# Patient Record
Sex: MALE | Race: WHITE | NOT HISPANIC OR LATINO | Employment: OTHER | URBAN - NONMETROPOLITAN AREA
[De-identification: names, ages, dates, MRNs, and addresses within clinical notes are randomized per-mention and may not be internally consistent; named-entity substitution may affect disease eponyms.]

---

## 2017-07-10 ENCOUNTER — HOSPITAL ENCOUNTER (EMERGENCY)
Facility: HOSPITAL | Age: 37
Discharge: HOME | End: 2017-07-10
Attending: STUDENT IN AN ORGANIZED HEALTH CARE EDUCATION/TRAINING PROGRAM | Admitting: FAMILY MEDICINE

## 2017-07-10 ENCOUNTER — APPOINTMENT (OUTPATIENT)
Dept: ULTRASOUND IMAGING | Facility: HOSPITAL | Age: 37
End: 2017-07-10

## 2017-07-10 VITALS
HEART RATE: 65 BPM | SYSTOLIC BLOOD PRESSURE: 128 MMHG | HEIGHT: 72 IN | DIASTOLIC BLOOD PRESSURE: 84 MMHG | OXYGEN SATURATION: 98 % | BODY MASS INDEX: 29.8 KG/M2 | TEMPERATURE: 98.4 F | WEIGHT: 220 LBS | RESPIRATION RATE: 17 BRPM

## 2017-07-10 DIAGNOSIS — Z86.39 HX OF DIABETES MELLITUS: ICD-10-CM

## 2017-07-10 DIAGNOSIS — R10.11 RIGHT UPPER QUADRANT ABDOMINAL PAIN: Primary | ICD-10-CM

## 2017-07-10 LAB
ALBUMIN SERPL-MCNC: 4.2 G/DL (ref 3.5–5)
ALBUMIN/GLOB SERPL: 1.5 G/DL (ref 1–2)
ALP SERPL-CCNC: 63 U/L (ref 38–126)
ALT SERPL W P-5'-P-CCNC: 61 U/L (ref 13–69)
AMYLASE SERPL-CCNC: 61 U/L (ref 30–110)
ANION GAP SERPL CALCULATED.3IONS-SCNC: 14.1 MMOL/L
AST SERPL-CCNC: 20 U/L (ref 15–46)
BASOPHILS # BLD AUTO: 0.02 10*3/MM3 (ref 0–0.2)
BASOPHILS NFR BLD AUTO: 0.3 % (ref 0–2.5)
BILIRUB SERPL-MCNC: 0.4 MG/DL (ref 0.2–1.3)
BILIRUB UR QL STRIP: NEGATIVE
BUN BLD-MCNC: 15 MG/DL (ref 7–20)
BUN/CREAT SERPL: 18.8 (ref 6.3–21.9)
CALCIUM SPEC-SCNC: 9.6 MG/DL (ref 8.4–10.2)
CHLORIDE SERPL-SCNC: 104 MMOL/L (ref 98–107)
CLARITY UR: CLEAR
CO2 SERPL-SCNC: 25 MMOL/L (ref 26–30)
COLOR UR: YELLOW
CREAT BLD-MCNC: 0.8 MG/DL (ref 0.6–1.3)
DEPRECATED RDW RBC AUTO: 38.9 FL (ref 37–54)
EOSINOPHIL # BLD AUTO: 0.16 10*3/MM3 (ref 0–0.7)
EOSINOPHIL NFR BLD AUTO: 2.7 % (ref 0–7)
ERYTHROCYTE [DISTWIDTH] IN BLOOD BY AUTOMATED COUNT: 12.9 % (ref 11.5–14.5)
GFR SERPL CREATININE-BSD FRML MDRD: 109 ML/MIN/1.73
GLOBULIN UR ELPH-MCNC: 2.8 GM/DL
GLUCOSE BLD-MCNC: 352 MG/DL (ref 74–98)
GLUCOSE UR STRIP-MCNC: ABNORMAL MG/DL
HCT VFR BLD AUTO: 42.2 % (ref 42–52)
HGB BLD-MCNC: 14.7 G/DL (ref 14–18)
HGB UR QL STRIP.AUTO: NEGATIVE
HOLD SPECIMEN: NORMAL
IMM GRANULOCYTES # BLD: 0.02 10*3/MM3 (ref 0–0.06)
IMM GRANULOCYTES NFR BLD: 0.3 % (ref 0–0.6)
KETONES UR QL STRIP: NEGATIVE
LEUKOCYTE ESTERASE UR QL STRIP.AUTO: NEGATIVE
LIPASE SERPL-CCNC: 80 U/L (ref 23–300)
LYMPHOCYTES # BLD AUTO: 1.59 10*3/MM3 (ref 0.6–3.4)
LYMPHOCYTES NFR BLD AUTO: 27.3 % (ref 10–50)
MCH RBC QN AUTO: 29.2 PG (ref 27–31)
MCHC RBC AUTO-ENTMCNC: 34.8 G/DL (ref 30–37)
MCV RBC AUTO: 83.9 FL (ref 80–94)
MONOCYTES # BLD AUTO: 0.37 10*3/MM3 (ref 0–0.9)
MONOCYTES NFR BLD AUTO: 6.4 % (ref 0–12)
NEUTROPHILS # BLD AUTO: 3.66 10*3/MM3 (ref 2–6.9)
NEUTROPHILS NFR BLD AUTO: 63 % (ref 37–80)
NITRITE UR QL STRIP: NEGATIVE
NRBC BLD MANUAL-RTO: 0 /100 WBC (ref 0–0)
PH UR STRIP.AUTO: 6 [PH] (ref 5–8)
PLATELET # BLD AUTO: 230 10*3/MM3 (ref 130–400)
PMV BLD AUTO: 9.5 FL (ref 6–12)
POTASSIUM BLD-SCNC: 4.1 MMOL/L (ref 3.5–5.1)
PROT SERPL-MCNC: 7 G/DL (ref 6.3–8.2)
PROT UR QL STRIP: NEGATIVE
RBC # BLD AUTO: 5.03 10*6/MM3 (ref 4.7–6.1)
SODIUM BLD-SCNC: 139 MMOL/L (ref 137–145)
SP GR UR STRIP: 1.01 (ref 1–1.03)
UROBILINOGEN UR QL STRIP: ABNORMAL
WBC NRBC COR # BLD: 5.82 10*3/MM3 (ref 4.8–10.8)
WHOLE BLOOD HOLD SPECIMEN: NORMAL
WHOLE BLOOD HOLD SPECIMEN: NORMAL

## 2017-07-10 PROCEDURE — 76705 ECHO EXAM OF ABDOMEN: CPT

## 2017-07-10 PROCEDURE — 82150 ASSAY OF AMYLASE: CPT | Performed by: NURSE PRACTITIONER

## 2017-07-10 PROCEDURE — 80053 COMPREHEN METABOLIC PANEL: CPT

## 2017-07-10 PROCEDURE — 99283 EMERGENCY DEPT VISIT LOW MDM: CPT

## 2017-07-10 PROCEDURE — 83690 ASSAY OF LIPASE: CPT

## 2017-07-10 PROCEDURE — 81003 URINALYSIS AUTO W/O SCOPE: CPT

## 2017-07-10 PROCEDURE — 85025 COMPLETE CBC W/AUTO DIFF WBC: CPT

## 2017-07-10 RX ORDER — GLIPIZIDE 5 MG/1
5 TABLET ORAL
COMMUNITY

## 2017-07-10 RX ORDER — SODIUM CHLORIDE 0.9 % (FLUSH) 0.9 %
10 SYRINGE (ML) INJECTION AS NEEDED
Status: DISCONTINUED | OUTPATIENT
Start: 2017-07-10 | End: 2017-07-10 | Stop reason: HOSPADM

## 2017-07-10 RX ORDER — ALUMINA, MAGNESIA, AND SIMETHICONE 2400; 2400; 240 MG/30ML; MG/30ML; MG/30ML
15 SUSPENSION ORAL ONCE
Status: COMPLETED | OUTPATIENT
Start: 2017-07-10 | End: 2017-07-10

## 2017-07-10 RX ADMIN — ALUMINUM HYDROXIDE, MAGNESIUM HYDROXIDE, AND DIMETHICONE 15 ML: 400; 400; 40 SUSPENSION ORAL at 14:59

## 2017-07-10 RX ADMIN — LIDOCAINE HYDROCHLORIDE 15 ML: 20 SOLUTION ORAL; TOPICAL at 14:59

## 2017-07-10 NOTE — ED PROVIDER NOTES
Subjective   HPI Comments: Patient presents to the emergency department with complaint of right upper quadrant abdominal pain that began suddenly about one hour ago while eating a meal.  Patient has no prior history of abdominal disorder with exception of a diagnosis of H. pylori for which he took the standard medications and completed them.  Patient had nausea without vomiting.  No diarrhea.  No history constipation.  Patient's pain has relieved somewhat    Patient is a 36 y.o. male presenting with abdominal pain.   History provided by:  Patient   used: No    Abdominal Pain   Pain location:  RUQ  Pain quality: aching    Pain radiates to:  Does not radiate  Pain severity:  Mild  Onset quality:  Sudden  Duration:  2 hours  Timing:  Constant  Progression:  Improving  Chronicity:  New  Context: eating    Context: not alcohol use, not awakening from sleep, not diet changes, not laxative use, not medication withdrawal, not previous surgeries, not recent travel, not retching, not sick contacts, not suspicious food intake and not trauma  Recent illness: Helicobacter pylori.    Relieved by:  Nothing  Worsened by:  Nothing  Ineffective treatments:  None tried  Associated symptoms: no anorexia, no belching, no chest pain, no chills, no constipation, no cough, no diarrhea, no dysuria, no fatigue, no fever, no flatus, no hematemesis, no hematochezia, no hematuria, no melena, no nausea, no shortness of breath, no sore throat and no vomiting        Review of Systems   Constitutional: Negative.  Negative for chills, diaphoresis, fatigue and fever.   HENT: Negative for sore throat.    Eyes: Negative.  Negative for pain and visual disturbance.   Respiratory: Negative for cough, shortness of breath, wheezing and stridor.    Cardiovascular: Negative.  Negative for chest pain.   Gastrointestinal: Positive for abdominal pain. Negative for anorexia, constipation, diarrhea, flatus, hematemesis, hematochezia, melena,  nausea and vomiting.   Endocrine: Negative.    Genitourinary: Negative.  Negative for dysuria and hematuria.   Musculoskeletal: Negative.  Negative for back pain and neck pain.   Skin: Negative.  Negative for pallor and rash.   Allergic/Immunologic: Negative.    Neurological: Negative.  Negative for syncope and headaches.   Hematological: Negative.    Psychiatric/Behavioral: Negative.  Negative for agitation.   All other systems reviewed and are negative.      Past Medical History:   Diagnosis Date   • Diabetes mellitus        Allergies   Allergen Reactions   • Codeine      Parent allergy   • Naproxen Swelling       History reviewed. No pertinent surgical history.    History reviewed. No pertinent family history.    Social History     Social History   • Marital status:      Spouse name: N/A   • Number of children: N/A   • Years of education: N/A     Social History Main Topics   • Smoking status: Never Smoker   • Smokeless tobacco: None   • Alcohol use Yes      Comment: occassional   • Drug use: No   • Sexual activity: Not Asked     Other Topics Concern   • None     Social History Narrative   • None           Objective   Physical Exam   Constitutional: He is oriented to person, place, and time. He appears well-developed and well-nourished. No distress.   HENT:   Head: Normocephalic and atraumatic.   Right Ear: External ear normal.   Left Ear: External ear normal.   Eyes: EOM are normal. Pupils are equal, round, and reactive to light.   Neck: Normal range of motion. Neck supple. No tracheal deviation present.   Cardiovascular: Normal rate and regular rhythm.    Pulmonary/Chest: Effort normal. No respiratory distress. He has no wheezes. He has no rales. He exhibits no tenderness.   Abdominal: Soft. Bowel sounds are normal. He exhibits no distension. Tenderness: Right upper quadrant, mild.  No guarding. There is no rebound and no guarding.   Musculoskeletal: Normal range of motion. He exhibits no edema,  tenderness or deformity.   Lymphadenopathy:     He has no cervical adenopathy.   Neurological: He is alert and oriented to person, place, and time.   Skin: Skin is warm and dry. No rash noted. He is not diaphoretic. No erythema. No pallor.   Psychiatric: He has a normal mood and affect. His behavior is normal.   Nursing note and vitals reviewed.      Procedures         ED Course  ED Course      Recent Results (from the past 24 hour(s))   Comprehensive Metabolic Panel    Collection Time: 07/10/17  1:54 PM   Result Value Ref Range    Glucose 352 (C) 74 - 98 mg/dL    BUN 15 7 - 20 mg/dL    Creatinine 0.80 0.60 - 1.30 mg/dL    Sodium 139 137 - 145 mmol/L    Potassium 4.1 3.5 - 5.1 mmol/L    Chloride 104 98 - 107 mmol/L    CO2 25.0 (L) 26.0 - 30.0 mmol/L    Calcium 9.6 8.4 - 10.2 mg/dL    Total Protein 7.0 6.3 - 8.2 g/dL    Albumin 4.20 3.50 - 5.00 g/dL    ALT (SGPT) 61 13 - 69 U/L    AST (SGOT) 20 15 - 46 U/L    Alkaline Phosphatase 63 38 - 126 U/L    Total Bilirubin 0.4 0.2 - 1.3 mg/dL    eGFR Non African Amer 109 >60 mL/min/1.73    Globulin 2.8 gm/dL    A/G Ratio 1.5 1.0 - 2.0 g/dL    BUN/Creatinine Ratio 18.8 6.3 - 21.9    Anion Gap 14.1 mmol/L   Lipase    Collection Time: 07/10/17  1:54 PM   Result Value Ref Range    Lipase 80 23 - 300 U/L   Light Blue Top    Collection Time: 07/10/17  1:54 PM   Result Value Ref Range    Extra Tube hold for add-on    Lavender Top    Collection Time: 07/10/17  1:54 PM   Result Value Ref Range    Extra Tube hold for add-on    Gold Top - SST    Collection Time: 07/10/17  1:54 PM   Result Value Ref Range    Extra Tube Hold for add-ons.    CBC Auto Differential    Collection Time: 07/10/17  1:54 PM   Result Value Ref Range    WBC 5.82 4.80 - 10.80 10*3/mm3    RBC 5.03 4.70 - 6.10 10*6/mm3    Hemoglobin 14.7 14.0 - 18.0 g/dL    Hematocrit 42.2 42.0 - 52.0 %    MCV 83.9 80.0 - 94.0 fL    MCH 29.2 27.0 - 31.0 pg    MCHC 34.8 30.0 - 37.0 g/dL    RDW 12.9 11.5 - 14.5 %    RDW-SD 38.9 37.0 -  54.0 fl    MPV 9.5 6.0 - 12.0 fL    Platelets 230 130 - 400 10*3/mm3    Neutrophil % 63.0 37.0 - 80.0 %    Lymphocyte % 27.3 10.0 - 50.0 %    Monocyte % 6.4 0.0 - 12.0 %    Eosinophil % 2.7 0.0 - 7.0 %    Basophil % 0.3 0.0 - 2.5 %    Immature Grans % 0.3 0.0 - 0.6 %    Neutrophils, Absolute 3.66 2.00 - 6.90 10*3/mm3    Lymphocytes, Absolute 1.59 0.60 - 3.40 10*3/mm3    Monocytes, Absolute 0.37 0.00 - 0.90 10*3/mm3    Eosinophils, Absolute 0.16 0.00 - 0.70 10*3/mm3    Basophils, Absolute 0.02 0.00 - 0.20 10*3/mm3    Immature Grans, Absolute 0.02 0.00 - 0.06 10*3/mm3    nRBC 0.0 0.0 - 0.0 /100 WBC   Amylase    Collection Time: 07/10/17  1:54 PM   Result Value Ref Range    Amylase 61 30 - 110 U/L   Urinalysis With / Culture If Indicated    Collection Time: 07/10/17  1:55 PM   Result Value Ref Range    Color, UA Yellow Yellow, Straw    Appearance, UA Clear Clear    pH, UA 6.0 5.0 - 8.0    Specific Gravity, UA 1.015 1.005 - 1.030    Glucose, UA >=1000 mg/dL (3+) (A) Negative    Ketones, UA Negative Negative    Bilirubin, UA Negative Negative    Blood, UA Negative Negative    Protein, UA Negative Negative    Leuk Esterase, UA Negative Negative    Nitrite, UA Negative Negative    Urobilinogen, UA 0.2 E.U./dL 0.2 - 1.0 E.U./dL     Note: In addition to lab results from this visit, the labs listed above may include labs taken at another facility or during a different encounter within the last 24 hours. Please correlate lab times with ED admission and discharge times for further clarification of the services performed during this visit.    US Gallbladder   Final Result   No obvious gallstones although assessment is limited due to   gallbladder contraction           This report was finalized on 7/10/2017 3:18 PM by Guru Alexander MD.        Vitals:    07/10/17 1203   BP: 152/83   BP Location: Left arm   Patient Position: Sitting   Pulse: 85   Resp: 17   Temp: 98.1 °F (36.7 °C)   TempSrc: Oral   SpO2: 95%   Weight: 220 lb (99.8  "kg)   Height: 72\" (182.9 cm)     Medications   sodium chloride 0.9 % flush 10 mL (not administered)   aluminum-magnesium hydroxide-simethicone (MAALOX MAX) 400-400-40 MG/5ML suspension 15 mL (15 mL Oral Given 7/10/17 0756)   lidocaine viscous (XYLOCAINE) 2 % mouth solution 15 mL (15 mL Mouth/Throat Given 7/10/17 1459)     ECG/EMG Results (last 24 hours)     ** No results found for the last 24 hours. **                      [facility]    Final diagnoses:   Right upper quadrant abdominal pain   Hx of diabetes mellitus            Juli Ortiz, APRN  07/10/17 1612    "

## 2017-07-28 ENCOUNTER — HOSPITAL ENCOUNTER (EMERGENCY)
Facility: HOSPITAL | Age: 37
Discharge: HOME | End: 2017-07-28
Attending: EMERGENCY MEDICINE | Admitting: EMERGENCY MEDICINE

## 2017-07-28 VITALS
HEIGHT: 72 IN | TEMPERATURE: 98.3 F | SYSTOLIC BLOOD PRESSURE: 131 MMHG | OXYGEN SATURATION: 96 % | RESPIRATION RATE: 19 BRPM | HEART RATE: 97 BPM | WEIGHT: 220 LBS | BODY MASS INDEX: 29.8 KG/M2 | DIASTOLIC BLOOD PRESSURE: 74 MMHG

## 2017-07-28 DIAGNOSIS — L02.415 ABSCESS OF HIP, RIGHT: ICD-10-CM

## 2017-07-28 DIAGNOSIS — L03.115 CELLULITIS OF HIP, RIGHT: ICD-10-CM

## 2017-07-28 DIAGNOSIS — L02.416 ABSCESS OF LEFT KNEE: Primary | ICD-10-CM

## 2017-07-28 LAB
ALBUMIN SERPL-MCNC: 4.5 G/DL (ref 3.5–5)
ALBUMIN/GLOB SERPL: 1.5 G/DL (ref 1–2)
ALP SERPL-CCNC: 78 U/L (ref 38–126)
ALT SERPL W P-5'-P-CCNC: 55 U/L (ref 13–69)
AMPHET+METHAMPHET UR QL: NEGATIVE
AMPHETAMINES UR QL: NEGATIVE
ANION GAP SERPL CALCULATED.3IONS-SCNC: 14.8 MMOL/L
AST SERPL-CCNC: 21 U/L (ref 15–46)
BARBITURATES UR QL SCN: NEGATIVE
BASOPHILS # BLD AUTO: 0.03 10*3/MM3 (ref 0–0.2)
BASOPHILS NFR BLD AUTO: 0.3 % (ref 0–2.5)
BENZODIAZ UR QL SCN: NEGATIVE
BILIRUB SERPL-MCNC: 0.5 MG/DL (ref 0.2–1.3)
BILIRUB UR QL STRIP: NEGATIVE
BUN BLD-MCNC: 17 MG/DL (ref 7–20)
BUN/CREAT SERPL: 21.3 (ref 6.3–21.9)
BUPRENORPHINE SERPL-MCNC: NEGATIVE NG/ML
CALCIUM SPEC-SCNC: 9.5 MG/DL (ref 8.4–10.2)
CANNABINOIDS SERPL QL: NEGATIVE
CHLORIDE SERPL-SCNC: 103 MMOL/L (ref 98–107)
CLARITY UR: CLEAR
CO2 SERPL-SCNC: 24 MMOL/L (ref 26–30)
COCAINE UR QL: NEGATIVE
COLOR UR: YELLOW
CREAT BLD-MCNC: 0.8 MG/DL (ref 0.6–1.3)
CRP SERPL-MCNC: 1.7 MG/DL (ref 0–1)
DEPRECATED RDW RBC AUTO: 40.1 FL (ref 37–54)
EOSINOPHIL # BLD AUTO: 0.14 10*3/MM3 (ref 0–0.7)
EOSINOPHIL NFR BLD AUTO: 1.3 % (ref 0–7)
ERYTHROCYTE [DISTWIDTH] IN BLOOD BY AUTOMATED COUNT: 13 % (ref 11.5–14.5)
ERYTHROCYTE [SEDIMENTATION RATE] IN BLOOD: 10 MM/HR (ref 0–15)
GFR SERPL CREATININE-BSD FRML MDRD: 109 ML/MIN/1.73
GLOBULIN UR ELPH-MCNC: 3 GM/DL
GLUCOSE BLD-MCNC: 179 MG/DL (ref 74–98)
GLUCOSE UR STRIP-MCNC: ABNORMAL MG/DL
HCT VFR BLD AUTO: 43.3 % (ref 42–52)
HGB BLD-MCNC: 14.9 G/DL (ref 14–18)
HGB UR QL STRIP.AUTO: NEGATIVE
IMM GRANULOCYTES # BLD: 0.03 10*3/MM3 (ref 0–0.06)
IMM GRANULOCYTES NFR BLD: 0.3 % (ref 0–0.6)
KETONES UR QL STRIP: ABNORMAL
LEUKOCYTE ESTERASE UR QL STRIP.AUTO: NEGATIVE
LYMPHOCYTES # BLD AUTO: 2.01 10*3/MM3 (ref 0.6–3.4)
LYMPHOCYTES NFR BLD AUTO: 18.5 % (ref 10–50)
MCH RBC QN AUTO: 28.9 PG (ref 27–31)
MCHC RBC AUTO-ENTMCNC: 34.4 G/DL (ref 30–37)
MCV RBC AUTO: 84.1 FL (ref 80–94)
METHADONE UR QL SCN: NEGATIVE
MONOCYTES # BLD AUTO: 0.68 10*3/MM3 (ref 0–0.9)
MONOCYTES NFR BLD AUTO: 6.3 % (ref 0–12)
NEUTROPHILS # BLD AUTO: 7.97 10*3/MM3 (ref 2–6.9)
NEUTROPHILS NFR BLD AUTO: 73.3 % (ref 37–80)
NITRITE UR QL STRIP: NEGATIVE
NRBC BLD MANUAL-RTO: 0 /100 WBC (ref 0–0)
OPIATES UR QL: NEGATIVE
OXYCODONE UR QL SCN: NEGATIVE
PCP UR QL SCN: NEGATIVE
PH UR STRIP.AUTO: 6 [PH] (ref 5–8)
PLATELET # BLD AUTO: 238 10*3/MM3 (ref 130–400)
PMV BLD AUTO: 9.5 FL (ref 6–12)
POTASSIUM BLD-SCNC: 3.8 MMOL/L (ref 3.5–5.1)
PROPOXYPH UR QL: NEGATIVE
PROT SERPL-MCNC: 7.5 G/DL (ref 6.3–8.2)
PROT UR QL STRIP: NEGATIVE
RBC # BLD AUTO: 5.15 10*6/MM3 (ref 4.7–6.1)
SODIUM BLD-SCNC: 138 MMOL/L (ref 137–145)
SP GR UR STRIP: >=1.03 (ref 1–1.03)
TRICYCLICS UR QL SCN: NEGATIVE
UROBILINOGEN UR QL STRIP: ABNORMAL
WBC NRBC COR # BLD: 10.86 10*3/MM3 (ref 4.8–10.8)

## 2017-07-28 PROCEDURE — 80053 COMPREHEN METABOLIC PANEL: CPT | Performed by: PHYSICIAN ASSISTANT

## 2017-07-28 PROCEDURE — 99284 EMERGENCY DEPT VISIT MOD MDM: CPT

## 2017-07-28 PROCEDURE — 85651 RBC SED RATE NONAUTOMATED: CPT | Performed by: PHYSICIAN ASSISTANT

## 2017-07-28 PROCEDURE — 85025 COMPLETE CBC W/AUTO DIFF WBC: CPT | Performed by: PHYSICIAN ASSISTANT

## 2017-07-28 PROCEDURE — 81003 URINALYSIS AUTO W/O SCOPE: CPT | Performed by: PHYSICIAN ASSISTANT

## 2017-07-28 PROCEDURE — 86140 C-REACTIVE PROTEIN: CPT | Performed by: PHYSICIAN ASSISTANT

## 2017-07-28 PROCEDURE — 80306 DRUG TEST PRSMV INSTRMNT: CPT | Performed by: PHYSICIAN ASSISTANT

## 2017-07-28 RX ORDER — SULFAMETHOXAZOLE AND TRIMETHOPRIM 800; 160 MG/1; MG/1
1 TABLET ORAL ONCE
Status: COMPLETED | OUTPATIENT
Start: 2017-07-28 | End: 2017-07-28

## 2017-07-28 RX ORDER — SULFAMETHOXAZOLE AND TRIMETHOPRIM 800; 160 MG/1; MG/1
1 TABLET ORAL 2 TIMES DAILY
Qty: 20 TABLET | Refills: 0 | Status: SHIPPED | OUTPATIENT
Start: 2017-07-28 | End: 2017-08-04

## 2017-07-28 RX ORDER — HYDROCODONE BITARTRATE AND ACETAMINOPHEN 5; 325 MG/1; MG/1
1 TABLET ORAL EVERY 6 HOURS PRN
Qty: 12 TABLET | Refills: 0 | Status: SHIPPED | OUTPATIENT
Start: 2017-07-28

## 2017-07-28 RX ORDER — HYDROCODONE BITARTRATE AND ACETAMINOPHEN 5; 325 MG/1; MG/1
1 TABLET ORAL ONCE
Status: COMPLETED | OUTPATIENT
Start: 2017-07-28 | End: 2017-07-28

## 2017-07-28 RX ORDER — LIDOCAINE HYDROCHLORIDE 10 MG/ML
10 INJECTION, SOLUTION INFILTRATION; PERINEURAL ONCE
Status: COMPLETED | OUTPATIENT
Start: 2017-07-28 | End: 2017-07-28

## 2017-07-28 RX ADMIN — SULFAMETHOXAZOLE AND TRIMETHOPRIM 160 MG: 800; 160 TABLET ORAL at 22:05

## 2017-07-28 RX ADMIN — LIDOCAINE HYDROCHLORIDE 10 ML: 10 INJECTION, SOLUTION INFILTRATION; PERINEURAL at 22:00

## 2017-07-28 RX ADMIN — HYDROCODONE BITARTRATE AND ACETAMINOPHEN 1 TABLET: 5; 325 TABLET ORAL at 22:05

## 2018-10-02 ENCOUNTER — HOSPITAL ENCOUNTER (OUTPATIENT)
Dept: GENERAL RADIOLOGY | Facility: HOSPITAL | Age: 38
Discharge: HOME | End: 2018-10-02
Admitting: FAMILY MEDICINE

## 2018-10-02 ENCOUNTER — TRANSCRIBE ORDERS (OUTPATIENT)
Dept: ADMINISTRATIVE | Facility: HOSPITAL | Age: 38
End: 2018-10-02

## 2018-10-02 DIAGNOSIS — M54.50 BILATERAL LOW BACK PAIN WITHOUT SCIATICA, UNSPECIFIED CHRONICITY: Primary | ICD-10-CM

## 2018-10-02 PROCEDURE — 72100 X-RAY EXAM L-S SPINE 2/3 VWS: CPT

## 2018-12-18 ENCOUNTER — HOSPITAL ENCOUNTER (EMERGENCY)
Facility: HOSPITAL | Age: 38
Discharge: HOME | End: 2018-12-18
Attending: EMERGENCY MEDICINE | Admitting: STUDENT IN AN ORGANIZED HEALTH CARE EDUCATION/TRAINING PROGRAM

## 2018-12-18 VITALS
HEART RATE: 99 BPM | OXYGEN SATURATION: 97 % | SYSTOLIC BLOOD PRESSURE: 126 MMHG | TEMPERATURE: 98.6 F | HEIGHT: 72 IN | WEIGHT: 224 LBS | RESPIRATION RATE: 16 BRPM | DIASTOLIC BLOOD PRESSURE: 76 MMHG | BODY MASS INDEX: 30.34 KG/M2

## 2018-12-18 DIAGNOSIS — J02.0 STREP THROAT: Primary | ICD-10-CM

## 2018-12-18 DIAGNOSIS — E10.65 HYPERGLYCEMIA DUE TO TYPE 1 DIABETES MELLITUS (HCC): ICD-10-CM

## 2018-12-18 LAB
A-A DO2: ABNORMAL MMHG
ALBUMIN SERPL-MCNC: 3.9 G/DL (ref 3.5–5)
ALBUMIN/GLOB SERPL: 1.5 G/DL (ref 1–2)
ALP SERPL-CCNC: 62 U/L (ref 38–126)
ALT SERPL W P-5'-P-CCNC: 38 U/L (ref 13–69)
ANION GAP SERPL CALCULATED.3IONS-SCNC: 9.6 MMOL/L (ref 10–20)
ARTERIAL PATENCY WRIST A: POSITIVE
AST SERPL-CCNC: 15 U/L (ref 15–46)
ATMOSPHERIC PRESS: 737 MMHG
BASE EXCESS BLDA CALC-SCNC: 0 MMOL/L (ref 0–2)
BASOPHILS # BLD AUTO: 0.03 10*3/MM3 (ref 0–0.2)
BASOPHILS # BLD AUTO: 0.05 10*3/MM3 (ref 0–0.2)
BASOPHILS NFR BLD AUTO: 0.2 % (ref 0–2.5)
BASOPHILS NFR BLD AUTO: 0.3 % (ref 0–2.5)
BDY SITE: ABNORMAL
BILIRUB SERPL-MCNC: 0.4 MG/DL (ref 0.2–1.3)
BUN BLD-MCNC: 9 MG/DL (ref 7–20)
BUN/CREAT SERPL: 11.3 (ref 6.3–21.9)
CALCIUM SPEC-SCNC: 8.4 MG/DL (ref 8.4–10.2)
CHLORIDE SERPL-SCNC: 105 MMOL/L (ref 98–107)
CO2 SERPL-SCNC: 25 MMOL/L (ref 26–30)
COHGB MFR BLD: <0.6 % (ref 0–2)
CREAT BLD-MCNC: 0.8 MG/DL (ref 0.6–1.3)
DEPRECATED RDW RBC AUTO: 41.1 FL (ref 37–54)
DEPRECATED RDW RBC AUTO: 41.6 FL (ref 37–54)
EOSINOPHIL # BLD AUTO: 0 10*3/MM3 (ref 0–0.7)
EOSINOPHIL # BLD AUTO: 0 10*3/MM3 (ref 0–0.7)
EOSINOPHIL NFR BLD AUTO: 0 % (ref 0–7)
EOSINOPHIL NFR BLD AUTO: 0 % (ref 0–7)
ERYTHROCYTE [DISTWIDTH] IN BLOOD BY AUTOMATED COUNT: 12.9 % (ref 11.5–14.5)
ERYTHROCYTE [DISTWIDTH] IN BLOOD BY AUTOMATED COUNT: 13 % (ref 11.5–14.5)
FLUAV AG NPH QL: NEGATIVE
FLUBV AG NPH QL IA: NEGATIVE
GFR SERPL CREATININE-BSD FRML MDRD: 108 ML/MIN/1.73
GLOBULIN UR ELPH-MCNC: 2.6 GM/DL
GLUCOSE BLD-MCNC: 212 MG/DL (ref 74–98)
GLUCOSE BLDC GLUCOMTR-MCNC: 289 MG/DL (ref 70–130)
HCO3 BLDA-SCNC: 23.2 MMOL/L (ref 22–28)
HCT VFR BLD AUTO: 39.2 % (ref 42–52)
HCT VFR BLD AUTO: 43 % (ref 42–52)
HCT VFR BLD CALC: 43.9 %
HGB BLD-MCNC: 13.7 G/DL (ref 14–18)
HGB BLD-MCNC: 14.4 G/DL (ref 14–18)
HGB BLDA-MCNC: 14.3 G/DL (ref 12–18)
HOROWITZ INDEX BLD+IHG-RTO: 21 %
IMM GRANULOCYTES # BLD: 0.12 10*3/MM3 (ref 0–0.06)
IMM GRANULOCYTES # BLD: 0.15 10*3/MM3 (ref 0–0.06)
IMM GRANULOCYTES NFR BLD: 0.7 % (ref 0–0.6)
IMM GRANULOCYTES NFR BLD: 0.8 % (ref 0–0.6)
LYMPHOCYTES # BLD AUTO: 1.2 10*3/MM3 (ref 0.6–3.4)
LYMPHOCYTES # BLD AUTO: 1.31 10*3/MM3 (ref 0.6–3.4)
LYMPHOCYTES NFR BLD AUTO: 6.4 % (ref 10–50)
LYMPHOCYTES NFR BLD AUTO: 7.7 % (ref 10–50)
MCH RBC QN AUTO: 29.3 PG (ref 27–31)
MCH RBC QN AUTO: 30.6 PG (ref 27–31)
MCHC RBC AUTO-ENTMCNC: 33.5 G/DL (ref 30–37)
MCHC RBC AUTO-ENTMCNC: 34.9 G/DL (ref 30–37)
MCV RBC AUTO: 87.4 FL (ref 80–94)
MCV RBC AUTO: 87.5 FL (ref 80–94)
METHGB BLD QL: 0.8 % (ref 0–1.5)
MODALITY: ABNORMAL
MONOCYTES # BLD AUTO: 0.74 10*3/MM3 (ref 0–0.9)
MONOCYTES # BLD AUTO: 0.74 10*3/MM3 (ref 0–0.9)
MONOCYTES NFR BLD AUTO: 3.9 % (ref 0–12)
MONOCYTES NFR BLD AUTO: 4.3 % (ref 0–12)
NEUTROPHILS # BLD AUTO: 14.83 10*3/MM3 (ref 2–6.9)
NEUTROPHILS # BLD AUTO: 16.7 10*3/MM3 (ref 2–6.9)
NEUTROPHILS NFR BLD AUTO: 87.1 % (ref 37–80)
NEUTROPHILS NFR BLD AUTO: 88.6 % (ref 37–80)
NOTE: ABNORMAL
NRBC BLD MANUAL-RTO: 0 /100 WBC (ref 0–0)
NRBC BLD MANUAL-RTO: 0 /100 WBC (ref 0–0)
OXYHGB MFR BLDV: 95.1 % (ref 94–99)
PCO2 BLDA: 32.9 MM HG (ref 35–45)
PCO2 TEMP ADJ BLD: ABNORMAL MM HG (ref 35–48)
PH BLDA: 7.46 PH UNITS (ref 7.3–7.5)
PH, TEMP CORRECTED: ABNORMAL PH UNITS
PLATELET # BLD AUTO: 206 10*3/MM3 (ref 130–400)
PLATELET # BLD AUTO: 236 10*3/MM3 (ref 130–400)
PMV BLD AUTO: 8.9 FL (ref 6–12)
PMV BLD AUTO: 9.1 FL (ref 6–12)
PO2 BLDA: 80.7 MM HG (ref 75–100)
PO2 TEMP ADJ BLD: ABNORMAL MM HG (ref 83–108)
POTASSIUM BLD-SCNC: 3.6 MMOL/L (ref 3.5–5.1)
PROT SERPL-MCNC: 6.5 G/DL (ref 6.3–8.2)
RBC # BLD AUTO: 4.48 10*6/MM3 (ref 4.7–6.1)
RBC # BLD AUTO: 4.92 10*6/MM3 (ref 4.7–6.1)
S PYO AG THROAT QL: POSITIVE
SAO2 % BLDCOA: 95.7 % (ref 94–100)
SODIUM BLD-SCNC: 136 MMOL/L (ref 137–145)
VENTILATOR MODE: ABNORMAL
WBC NRBC COR # BLD: 17.03 10*3/MM3 (ref 4.8–10.8)
WBC NRBC COR # BLD: 18.84 10*3/MM3 (ref 4.8–10.8)

## 2018-12-18 PROCEDURE — 80053 COMPREHEN METABOLIC PANEL: CPT | Performed by: PHYSICIAN ASSISTANT

## 2018-12-18 PROCEDURE — 85025 COMPLETE CBC W/AUTO DIFF WBC: CPT | Performed by: PHYSICIAN ASSISTANT

## 2018-12-18 PROCEDURE — 82805 BLOOD GASES W/O2 SATURATION: CPT

## 2018-12-18 PROCEDURE — 87804 INFLUENZA ASSAY W/OPTIC: CPT | Performed by: PHYSICIAN ASSISTANT

## 2018-12-18 PROCEDURE — 85025 COMPLETE CBC W/AUTO DIFF WBC: CPT | Performed by: EMERGENCY MEDICINE

## 2018-12-18 PROCEDURE — 36600 WITHDRAWAL OF ARTERIAL BLOOD: CPT

## 2018-12-18 PROCEDURE — 25010000002 PENICILLIN G BENZATHINE PER 1200000 UNITS: Performed by: PHYSICIAN ASSISTANT

## 2018-12-18 PROCEDURE — 96372 THER/PROPH/DIAG INJ SC/IM: CPT

## 2018-12-18 PROCEDURE — 82962 GLUCOSE BLOOD TEST: CPT

## 2018-12-18 PROCEDURE — 99283 EMERGENCY DEPT VISIT LOW MDM: CPT

## 2018-12-18 PROCEDURE — 83050 HGB METHEMOGLOBIN QUAN: CPT

## 2018-12-18 PROCEDURE — 82375 ASSAY CARBOXYHB QUANT: CPT

## 2018-12-18 PROCEDURE — 87880 STREP A ASSAY W/OPTIC: CPT | Performed by: PHYSICIAN ASSISTANT

## 2018-12-18 RX ORDER — SODIUM CHLORIDE 0.9 % (FLUSH) 0.9 %
10 SYRINGE (ML) INJECTION AS NEEDED
Status: DISCONTINUED | OUTPATIENT
Start: 2018-12-18 | End: 2018-12-18 | Stop reason: HOSPADM

## 2018-12-18 RX ADMIN — SODIUM CHLORIDE 1000 ML: 9 INJECTION, SOLUTION INTRAVENOUS at 13:48

## 2018-12-18 RX ADMIN — PENICILLIN G BENZATHINE 1.2 MILLION UNITS: 1200000 INJECTION, SUSPENSION INTRAMUSCULAR at 13:49

## 2018-12-18 NOTE — ED PROVIDER NOTES
Subjective   This is a 38-year-old male comes in with chief complaint fever, chills, sore throat, nausea, vomiting past 2 days.  Patient does state he said subjective low-grade fever.  Denies any cough, congestion, abdominal pain, dysuria.  Denies any known sick contact.  Patient does state he is is insulin dependent diabetic.  Also reports hypertension.        History provided by:  Patient   used: No    Flu Symptoms   Presenting symptoms: fever, nausea, sore throat and vomiting    Presenting symptoms: no cough    Severity:  Moderate  Onset quality:  Sudden  Duration:  3 days  Progression:  Worsening  Chronicity:  New  Relieved by:  Nothing  Worsened by:  Nothing  Ineffective treatments:  None tried  Associated symptoms: chills    Associated symptoms: no ear pain, no congestion and no neck stiffness    Risk factors: not elderly, no diabetes problem, no heart disease, no immunocompromised state, no kidney disease, no liver disease, not pregnant and no sick contacts        Review of Systems   Constitutional: Positive for chills and fever.   HENT: Positive for sore throat. Negative for congestion, dental problem, drooling, ear discharge and ear pain.    Respiratory: Negative.  Negative for cough, choking and chest tightness.    Gastrointestinal: Positive for nausea and vomiting. Negative for abdominal distention, abdominal pain and blood in stool.   Musculoskeletal: Negative.  Negative for arthralgias, back pain, gait problem and neck stiffness.   Skin: Negative.  Negative for color change and pallor.   All other systems reviewed and are negative.      Past Medical History:   Diagnosis Date   • Diabetes mellitus (CMS/HCC)    • Hypertension        Allergies   Allergen Reactions   • Codeine      Parent allergy   • Naproxen Swelling       Past Surgical History:   Procedure Laterality Date   • ORTHOPEDIC SURGERY         History reviewed. No pertinent family history.    Social History     Socioeconomic  History   • Marital status:      Spouse name: Not on file   • Number of children: Not on file   • Years of education: Not on file   • Highest education level: Not on file   Tobacco Use   • Smoking status: Never Smoker   Substance and Sexual Activity   • Alcohol use: Yes     Comment: occassional   • Drug use: No           Objective   Physical Exam   Constitutional: He is oriented to person, place, and time. He appears well-developed and well-nourished. No distress.   HENT:   Head: Normocephalic.   Right Ear: External ear normal.   Left Ear: External ear normal.   Nose: Nose normal.   Mouth/Throat: Oropharyngeal exudate, posterior oropharyngeal edema and posterior oropharyngeal erythema present. Tonsils are 2+ on the right. Tonsils are 2+ on the left.   Eyes: Conjunctivae and EOM are normal. Pupils are equal, round, and reactive to light. Right eye exhibits no discharge. Left eye exhibits no discharge. No scleral icterus.   Neck: Normal range of motion. Neck supple. No JVD present. No tracheal deviation present. No thyromegaly present.   Cardiovascular: Normal rate, regular rhythm, normal heart sounds and intact distal pulses. Exam reveals no gallop and no friction rub.   No murmur heard.  Pulmonary/Chest: Effort normal and breath sounds normal. No stridor. No respiratory distress. He has no wheezes. He has no rales. He exhibits no tenderness.   Abdominal: Soft. Bowel sounds are normal. He exhibits no distension and no mass. There is no tenderness. There is no rebound and no guarding.   Musculoskeletal: Normal range of motion. He exhibits no edema, tenderness or deformity.   Neurological: He is alert and oriented to person, place, and time. He displays normal reflexes. No cranial nerve deficit or sensory deficit. He exhibits normal muscle tone. Coordination normal.   Skin: Skin is warm and dry. No rash noted. He is not diaphoretic. No erythema. No pallor.   Psychiatric: He has a normal mood and affect.  Judgment and thought content normal.   Nursing note and vitals reviewed.      Procedures           ED Course  ED Course as of Dec 18 1524   Tue Dec 18, 2018   1339 Patient does have positive strep screen.  Patient is insulin-dependent diabetic.  Will start IV fluids and perform further blood work to rule out DKA.  Patient's blood glucose is 289.  States he typically runs in the low 100s.  [BH]   1515 Patient has had <12 anion gap. Patient blood gas is within normal limits. Rule out to have DKA. Will be discharged. Was treated with IM PCN    []      ED Course User Index  [] Jb Mina PA-C                  [facility]      Final diagnoses:   Strep throat   Hyperglycemia due to type 1 diabetes mellitus (CMS/[facility])            Jb Mina PA-C  12/18/18 1524

## 2021-03-17 ENCOUNTER — HOSPITAL ENCOUNTER (EMERGENCY)
Facility: HOSPITAL | Age: 41
Discharge: HOME | End: 2021-03-17
Attending: EMERGENCY MEDICINE | Admitting: EMERGENCY MEDICINE

## 2021-03-17 VITALS
HEART RATE: 92 BPM | WEIGHT: 239 LBS | BODY MASS INDEX: 34.22 KG/M2 | SYSTOLIC BLOOD PRESSURE: 133 MMHG | DIASTOLIC BLOOD PRESSURE: 78 MMHG | TEMPERATURE: 98.3 F | OXYGEN SATURATION: 96 % | HEIGHT: 70 IN | RESPIRATION RATE: 14 BRPM

## 2021-03-17 DIAGNOSIS — R73.9 HYPERGLYCEMIA: ICD-10-CM

## 2021-03-17 DIAGNOSIS — S61.211A LACERATION OF LEFT INDEX FINGER WITHOUT DAMAGE TO NAIL, FOREIGN BODY PRESENCE UNSPECIFIED, INITIAL ENCOUNTER: Primary | ICD-10-CM

## 2021-03-17 LAB
ALBUMIN SERPL-MCNC: 4.3 G/DL (ref 3.5–5.2)
ALBUMIN/GLOB SERPL: 1.5 G/DL
ALP SERPL-CCNC: 107 U/L (ref 39–117)
ALT SERPL W P-5'-P-CCNC: 96 U/L (ref 1–41)
ANION GAP SERPL CALCULATED.3IONS-SCNC: 13 MMOL/L (ref 5–15)
AST SERPL-CCNC: 60 U/L (ref 1–40)
BASOPHILS # BLD AUTO: 0.05 10*3/MM3 (ref 0–0.2)
BASOPHILS NFR BLD AUTO: 0.7 % (ref 0–1.5)
BILIRUB SERPL-MCNC: 0.2 MG/DL (ref 0–1.2)
BUN SERPL-MCNC: 11 MG/DL (ref 6–20)
BUN/CREAT SERPL: 11.7 (ref 7–25)
CALCIUM SPEC-SCNC: 9.5 MG/DL (ref 8.6–10.5)
CHLORIDE SERPL-SCNC: 98 MMOL/L (ref 98–107)
CO2 SERPL-SCNC: 26 MMOL/L (ref 22–29)
CREAT SERPL-MCNC: 0.94 MG/DL (ref 0.76–1.27)
DEPRECATED RDW RBC AUTO: 40.4 FL (ref 37–54)
EOSINOPHIL # BLD AUTO: 0.26 10*3/MM3 (ref 0–0.4)
EOSINOPHIL NFR BLD AUTO: 3.4 % (ref 0.3–6.2)
ERYTHROCYTE [DISTWIDTH] IN BLOOD BY AUTOMATED COUNT: 12.3 % (ref 12.3–15.4)
GFR SERPL CREATININE-BSD FRML MDRD: 88 ML/MIN/1.73
GLOBULIN UR ELPH-MCNC: 2.8 GM/DL
GLUCOSE BLDC GLUCOMTR-MCNC: 240 MG/DL (ref 70–130)
GLUCOSE BLDC GLUCOMTR-MCNC: 395 MG/DL (ref 70–130)
GLUCOSE SERPL-MCNC: 299 MG/DL (ref 65–99)
HCT VFR BLD AUTO: 42.9 % (ref 37.5–51)
HGB BLD-MCNC: 14.9 G/DL (ref 13–17.7)
IMM GRANULOCYTES # BLD AUTO: 0.02 10*3/MM3 (ref 0–0.05)
IMM GRANULOCYTES NFR BLD AUTO: 0.3 % (ref 0–0.5)
LYMPHOCYTES # BLD AUTO: 2.3 10*3/MM3 (ref 0.7–3.1)
LYMPHOCYTES NFR BLD AUTO: 30.5 % (ref 19.6–45.3)
MCH RBC QN AUTO: 31 PG (ref 26.6–33)
MCHC RBC AUTO-ENTMCNC: 34.7 G/DL (ref 31.5–35.7)
MCV RBC AUTO: 89.2 FL (ref 79–97)
MONOCYTES # BLD AUTO: 0.41 10*3/MM3 (ref 0.1–0.9)
MONOCYTES NFR BLD AUTO: 5.4 % (ref 5–12)
NEUTROPHILS NFR BLD AUTO: 4.51 10*3/MM3 (ref 1.7–7)
NEUTROPHILS NFR BLD AUTO: 59.7 % (ref 42.7–76)
NRBC BLD AUTO-RTO: 0 /100 WBC (ref 0–0.2)
PLATELET # BLD AUTO: 285 10*3/MM3 (ref 140–450)
PMV BLD AUTO: 9.2 FL (ref 6–12)
POTASSIUM SERPL-SCNC: 3.9 MMOL/L (ref 3.5–5.2)
PROT SERPL-MCNC: 7.1 G/DL (ref 6–8.5)
RBC # BLD AUTO: 4.81 10*6/MM3 (ref 4.14–5.8)
SODIUM SERPL-SCNC: 137 MMOL/L (ref 136–145)
WBC # BLD AUTO: 7.55 10*3/MM3 (ref 3.4–10.8)

## 2021-03-17 PROCEDURE — 25010000003 LIDOCAINE 1 % SOLUTION: Performed by: PHYSICIAN ASSISTANT

## 2021-03-17 PROCEDURE — 80053 COMPREHEN METABOLIC PANEL: CPT | Performed by: PHYSICIAN ASSISTANT

## 2021-03-17 PROCEDURE — 85025 COMPLETE CBC W/AUTO DIFF WBC: CPT | Performed by: PHYSICIAN ASSISTANT

## 2021-03-17 PROCEDURE — 82962 GLUCOSE BLOOD TEST: CPT

## 2021-03-17 PROCEDURE — 99283 EMERGENCY DEPT VISIT LOW MDM: CPT

## 2021-03-17 RX ORDER — LIDOCAINE HYDROCHLORIDE 10 MG/ML
10 INJECTION, SOLUTION INFILTRATION; PERINEURAL ONCE
Status: COMPLETED | OUTPATIENT
Start: 2021-03-17 | End: 2021-03-17

## 2021-03-17 RX ORDER — SODIUM CHLORIDE 0.9 % (FLUSH) 0.9 %
10 SYRINGE (ML) INJECTION AS NEEDED
Status: DISCONTINUED | OUTPATIENT
Start: 2021-03-17 | End: 2021-03-17 | Stop reason: HOSPADM

## 2021-03-17 RX ADMIN — LIDOCAINE HYDROCHLORIDE 10 ML: 10 INJECTION, SOLUTION INFILTRATION; PERINEURAL at 17:17

## 2021-03-17 RX ADMIN — SODIUM CHLORIDE 1000 ML: 9 INJECTION, SOLUTION INTRAVENOUS at 18:30

## 2021-03-17 NOTE — ED PROVIDER NOTES
Subjective   This patient comes in for evaluation of laceration of the dorsal aspect of his left index finger.  He states his tetanus shot is up-to-date.  He was using a  and accidentally sustained a laceration to the dorsal aspect of the DIP of the left second finger.  Good strength and extension against resistance of all joints of left second digit without signs of tendon injury.  Bleeding trolled with direct pressure.          Review of Systems   Constitutional: Negative.    HENT: Negative.    Eyes: Negative.    Respiratory: Negative.    Cardiovascular: Negative.    Gastrointestinal: Negative.    Genitourinary: Negative.    Musculoskeletal: Negative.    Skin:        Finger laceration   Allergic/Immunologic: Negative.    Neurological: Negative.    Psychiatric/Behavioral: Negative.    All other systems reviewed and are negative.      Past Medical History:   Diagnosis Date   • Diabetes mellitus (CMS/HCC)    • Hypertension        Allergies   Allergen Reactions   • Naproxen Swelling   • Codeine Other (See Comments)     Parent allergy       Past Surgical History:   Procedure Laterality Date   • ORTHOPEDIC SURGERY         History reviewed. No pertinent family history.    Social History     Socioeconomic History   • Marital status:      Spouse name: Not on file   • Number of children: Not on file   • Years of education: Not on file   • Highest education level: Not on file   Tobacco Use   • Smoking status: Never Smoker   Substance and Sexual Activity   • Alcohol use: Yes     Comment: occassional   • Drug use: No           Objective   Physical Exam  Vitals and nursing note reviewed.   Constitutional:       Appearance: Normal appearance.   HENT:      Head: Normocephalic and atraumatic.   Eyes:      Extraocular Movements: Extraocular movements intact.   Cardiovascular:      Rate and Rhythm: Normal rate and regular rhythm.   Pulmonary:      Effort: Pulmonary effort is normal.   Abdominal:      General:  Abdomen is flat.   Musculoskeletal:         General: Normal range of motion.      Cervical back: Normal range of motion.      Comments: Good strength and extension against resistance at the DIP PIP and MCP joint of the left index finger   Skin:     General: Skin is warm and dry.      Comments: 2 cm laceration to the dorsal aspect of DIP of the left second finger.  No signs of tendon injury.  Bleeding controlled direct pressure.   Neurological:      General: No focal deficit present.      Mental Status: He is alert.   Psychiatric:         Mood and Affect: Mood normal.         Behavior: Behavior normal.         Procedures           ED Course      Noted that the patient had effective blood glucose performed upon arrival which was 395.  Patient states he feels a little lightheaded and his sugar usually is about 120.  He is insulin-dependent and takes long-acting insulin morning and night.  States after suturing states he still feels a little lightheaded and would like his hypoglycemia evaluated.  He has no chest pain or shortness of breath.  No nausea vomiting diarrhea.    Laceration Repair: 2 similar laceration to the dorsal aspect of the DIP of the left second finger    Consent obtained, discussed with patient all risks and benefits.    Patient underwent sterile prep technique with Hibiclens and sterile water    Anesthesia was obtained with 1% lidocaine without epinephrine    Wound explored and no foreign body/bodies observed    Evidence of tendon injury: None    Laceration was closed with 5-0 nylon running interlocking sutures #7    Dressing nonstick and gauze wrap    Patient was examined post procedure and was neurovascular intact    Tolerated well, no complications     Patient's blood glucose is come down to 240.  He is resting comfortably.  He is due for his nighttime insulin in a few hours.                                MDM    Final diagnoses:   Laceration of left index finger without damage to nail, foreign  body presence unspecified, initial encounter   Hyperglycemia       ED Disposition  ED Disposition     ED Disposition Condition Comment    Discharge Stable           [facility]  [address] [facility] [facility] [address] [phone]  In 1 week      Rosalino Arellano MD  [address] [place] [id number] [phone]      As needed         Medication List      No changes were made to your prescriptions during this visit.          Ramesh Willis PA-C  03/17/21 1931

## 2022-03-31 ENCOUNTER — HOSPITAL ENCOUNTER (EMERGENCY)
Facility: HOSPITAL | Age: 42
Discharge: HOME | End: 2022-03-31
Attending: EMERGENCY MEDICINE | Admitting: EMERGENCY MEDICINE

## 2022-03-31 ENCOUNTER — APPOINTMENT (OUTPATIENT)
Dept: CT IMAGING | Facility: HOSPITAL | Age: 42
End: 2022-03-31

## 2022-03-31 ENCOUNTER — APPOINTMENT (OUTPATIENT)
Dept: ULTRASOUND IMAGING | Facility: HOSPITAL | Age: 42
End: 2022-03-31

## 2022-03-31 VITALS
BODY MASS INDEX: 27.36 KG/M2 | WEIGHT: 202 LBS | TEMPERATURE: 98 F | RESPIRATION RATE: 16 BRPM | OXYGEN SATURATION: 99 % | HEIGHT: 72 IN | HEART RATE: 72 BPM | DIASTOLIC BLOOD PRESSURE: 88 MMHG | SYSTOLIC BLOOD PRESSURE: 135 MMHG

## 2022-03-31 DIAGNOSIS — R63.0 POOR APPETITE: Primary | ICD-10-CM

## 2022-03-31 DIAGNOSIS — F41.9 ANXIETY: ICD-10-CM

## 2022-03-31 DIAGNOSIS — G47.9 RESTLESS SLEEPER: ICD-10-CM

## 2022-03-31 DIAGNOSIS — F12.90 MARIJUANA USE: ICD-10-CM

## 2022-03-31 LAB
ALBUMIN SERPL-MCNC: 4.2 G/DL (ref 3.5–5.2)
ALBUMIN/GLOB SERPL: 1.6 G/DL
ALP SERPL-CCNC: 80 U/L (ref 39–117)
ALT SERPL W P-5'-P-CCNC: 13 U/L (ref 1–41)
AMPHET+METHAMPHET UR QL: NEGATIVE
AMPHETAMINES UR QL: NEGATIVE
ANION GAP SERPL CALCULATED.3IONS-SCNC: 12.8 MMOL/L (ref 5–15)
AST SERPL-CCNC: 14 U/L (ref 1–40)
BARBITURATES UR QL SCN: NEGATIVE
BASOPHILS # BLD AUTO: 0.02 10*3/MM3 (ref 0–0.2)
BASOPHILS NFR BLD AUTO: 0.2 % (ref 0–1.5)
BENZODIAZ UR QL SCN: NEGATIVE
BILIRUB SERPL-MCNC: 0.4 MG/DL (ref 0–1.2)
BILIRUB UR QL STRIP: NEGATIVE
BUN SERPL-MCNC: 6 MG/DL (ref 6–20)
BUN/CREAT SERPL: 7 (ref 7–25)
BUPRENORPHINE SERPL-MCNC: NEGATIVE NG/ML
CALCIUM SPEC-SCNC: 10 MG/DL (ref 8.6–10.5)
CANNABINOIDS SERPL QL: POSITIVE
CHLORIDE SERPL-SCNC: 97 MMOL/L (ref 98–107)
CLARITY UR: CLEAR
CO2 SERPL-SCNC: 24.2 MMOL/L (ref 22–29)
COCAINE UR QL: NEGATIVE
COLOR UR: YELLOW
CREAT SERPL-MCNC: 0.86 MG/DL (ref 0.76–1.27)
DEPRECATED RDW RBC AUTO: 42.5 FL (ref 37–54)
EGFRCR SERPLBLD CKD-EPI 2021: 110.9 ML/MIN/1.73
EOSINOPHIL # BLD AUTO: 0.04 10*3/MM3 (ref 0–0.4)
EOSINOPHIL NFR BLD AUTO: 0.4 % (ref 0.3–6.2)
ERYTHROCYTE [DISTWIDTH] IN BLOOD BY AUTOMATED COUNT: 13.1 % (ref 12.3–15.4)
GLOBULIN UR ELPH-MCNC: 2.7 GM/DL
GLUCOSE SERPL-MCNC: 142 MG/DL (ref 65–99)
GLUCOSE UR STRIP-MCNC: NEGATIVE MG/DL
HCT VFR BLD AUTO: 45.8 % (ref 37.5–51)
HGB BLD-MCNC: 15.8 G/DL (ref 13–17.7)
HGB UR QL STRIP.AUTO: NEGATIVE
HOLD SPECIMEN: NORMAL
HOLD SPECIMEN: NORMAL
IMM GRANULOCYTES # BLD AUTO: 0.02 10*3/MM3 (ref 0–0.05)
IMM GRANULOCYTES NFR BLD AUTO: 0.2 % (ref 0–0.5)
KETONES UR QL STRIP: NEGATIVE
LEUKOCYTE ESTERASE UR QL STRIP.AUTO: NEGATIVE
LIPASE SERPL-CCNC: 65 U/L (ref 13–60)
LYMPHOCYTES # BLD AUTO: 2.34 10*3/MM3 (ref 0.7–3.1)
LYMPHOCYTES NFR BLD AUTO: 26 % (ref 19.6–45.3)
MAGNESIUM SERPL-MCNC: 1.9 MG/DL (ref 1.6–2.6)
MCH RBC QN AUTO: 30.5 PG (ref 26.6–33)
MCHC RBC AUTO-ENTMCNC: 34.5 G/DL (ref 31.5–35.7)
MCV RBC AUTO: 88.4 FL (ref 79–97)
METHADONE UR QL SCN: NEGATIVE
MONOCYTES # BLD AUTO: 0.45 10*3/MM3 (ref 0.1–0.9)
MONOCYTES NFR BLD AUTO: 5 % (ref 5–12)
NEUTROPHILS NFR BLD AUTO: 6.13 10*3/MM3 (ref 1.7–7)
NEUTROPHILS NFR BLD AUTO: 68.2 % (ref 42.7–76)
NITRITE UR QL STRIP: NEGATIVE
NRBC BLD AUTO-RTO: 0 /100 WBC (ref 0–0.2)
OPIATES UR QL: NEGATIVE
OXYCODONE UR QL SCN: NEGATIVE
PCP UR QL SCN: NEGATIVE
PH UR STRIP.AUTO: 7.5 [PH] (ref 5–8)
PLATELET # BLD AUTO: 308 10*3/MM3 (ref 140–450)
PMV BLD AUTO: 9.6 FL (ref 6–12)
POTASSIUM SERPL-SCNC: 3.8 MMOL/L (ref 3.5–5.2)
PROPOXYPH UR QL: NEGATIVE
PROT SERPL-MCNC: 6.9 G/DL (ref 6–8.5)
PROT UR QL STRIP: NEGATIVE
RBC # BLD AUTO: 5.18 10*6/MM3 (ref 4.14–5.8)
SODIUM SERPL-SCNC: 134 MMOL/L (ref 136–145)
SP GR UR STRIP: <=1.005 (ref 1–1.03)
TRICYCLICS UR QL SCN: NEGATIVE
UROBILINOGEN UR QL STRIP: NORMAL
WBC NRBC COR # BLD: 9 10*3/MM3 (ref 3.4–10.8)
WHOLE BLOOD HOLD SPECIMEN: NORMAL
WHOLE BLOOD HOLD SPECIMEN: NORMAL

## 2022-03-31 PROCEDURE — 81003 URINALYSIS AUTO W/O SCOPE: CPT | Performed by: PHYSICIAN ASSISTANT

## 2022-03-31 PROCEDURE — 83735 ASSAY OF MAGNESIUM: CPT | Performed by: PHYSICIAN ASSISTANT

## 2022-03-31 PROCEDURE — 76705 ECHO EXAM OF ABDOMEN: CPT

## 2022-03-31 PROCEDURE — 83690 ASSAY OF LIPASE: CPT | Performed by: PHYSICIAN ASSISTANT

## 2022-03-31 PROCEDURE — 74176 CT ABD & PELVIS W/O CONTRAST: CPT

## 2022-03-31 PROCEDURE — 80053 COMPREHEN METABOLIC PANEL: CPT | Performed by: PHYSICIAN ASSISTANT

## 2022-03-31 PROCEDURE — 80306 DRUG TEST PRSMV INSTRMNT: CPT | Performed by: PHYSICIAN ASSISTANT

## 2022-03-31 PROCEDURE — 99283 EMERGENCY DEPT VISIT LOW MDM: CPT

## 2022-03-31 PROCEDURE — 85025 COMPLETE CBC W/AUTO DIFF WBC: CPT | Performed by: PHYSICIAN ASSISTANT

## 2022-03-31 RX ORDER — SODIUM CHLORIDE 0.9 % (FLUSH) 0.9 %
10 SYRINGE (ML) INJECTION AS NEEDED
Status: DISCONTINUED | OUTPATIENT
Start: 2022-03-31 | End: 2022-03-31 | Stop reason: HOSPADM

## 2022-03-31 RX ORDER — HYDROXYZINE PAMOATE 50 MG/1
50 CAPSULE ORAL 3 TIMES DAILY PRN
Qty: 15 CAPSULE | Refills: 0 | Status: SHIPPED | OUTPATIENT
Start: 2022-03-31

## 2022-07-21 ENCOUNTER — TRANSCRIBE ORDERS (OUTPATIENT)
Dept: OTHER | Facility: OTHER | Age: 42
End: 2022-07-21

## 2022-07-21 ENCOUNTER — LAB (OUTPATIENT)
Dept: LAB | Facility: HOSPITAL | Age: 42
End: 2022-07-21

## 2022-07-21 DIAGNOSIS — L40.0 PSORIASIS VULGARIS: ICD-10-CM

## 2022-07-21 DIAGNOSIS — R63.4 LOSS OF WEIGHT: Primary | ICD-10-CM

## 2022-07-21 DIAGNOSIS — R63.4 LOSS OF WEIGHT: ICD-10-CM

## 2022-07-21 LAB
ALBUMIN SERPL-MCNC: 4 G/DL (ref 3.5–5.2)
ALBUMIN/GLOB SERPL: 1.7 G/DL
ALP SERPL-CCNC: 63 U/L (ref 39–117)
ALT SERPL W P-5'-P-CCNC: 9 U/L (ref 1–41)
AMYLASE SERPL-CCNC: 45 U/L (ref 28–100)
ANION GAP SERPL CALCULATED.3IONS-SCNC: 8.1 MMOL/L (ref 5–15)
AST SERPL-CCNC: 13 U/L (ref 1–40)
BASOPHILS # BLD AUTO: 0.03 10*3/MM3 (ref 0–0.2)
BASOPHILS NFR BLD AUTO: 0.4 % (ref 0–1.5)
BILIRUB SERPL-MCNC: 0.3 MG/DL (ref 0–1.2)
BUN SERPL-MCNC: 6 MG/DL (ref 6–20)
BUN/CREAT SERPL: 7.7 (ref 7–25)
CALCIUM SPEC-SCNC: 9 MG/DL (ref 8.6–10.5)
CHLORIDE SERPL-SCNC: 102 MMOL/L (ref 98–107)
CO2 SERPL-SCNC: 26.9 MMOL/L (ref 22–29)
CREAT SERPL-MCNC: 0.78 MG/DL (ref 0.76–1.27)
DEPRECATED RDW RBC AUTO: 44.6 FL (ref 37–54)
EGFRCR SERPLBLD CKD-EPI 2021: 114.2 ML/MIN/1.73
EOSINOPHIL # BLD AUTO: 0.23 10*3/MM3 (ref 0–0.4)
EOSINOPHIL NFR BLD AUTO: 3 % (ref 0.3–6.2)
ERYTHROCYTE [DISTWIDTH] IN BLOOD BY AUTOMATED COUNT: 13.6 % (ref 12.3–15.4)
ERYTHROCYTE [SEDIMENTATION RATE] IN BLOOD: 24 MM/HR (ref 0–15)
GLOBULIN UR ELPH-MCNC: 2.3 GM/DL
GLUCOSE SERPL-MCNC: 79 MG/DL (ref 65–99)
HBA1C MFR BLD: 6 % (ref 4.8–5.6)
HCT VFR BLD AUTO: 35.8 % (ref 37.5–51)
HGB BLD-MCNC: 11.9 G/DL (ref 13–17.7)
IMM GRANULOCYTES # BLD AUTO: 0.03 10*3/MM3 (ref 0–0.05)
IMM GRANULOCYTES NFR BLD AUTO: 0.4 % (ref 0–0.5)
LIPASE SERPL-CCNC: 23 U/L (ref 13–60)
LYMPHOCYTES # BLD AUTO: 2.46 10*3/MM3 (ref 0.7–3.1)
LYMPHOCYTES NFR BLD AUTO: 32.4 % (ref 19.6–45.3)
MCH RBC QN AUTO: 29.9 PG (ref 26.6–33)
MCHC RBC AUTO-ENTMCNC: 33.2 G/DL (ref 31.5–35.7)
MCV RBC AUTO: 89.9 FL (ref 79–97)
MONOCYTES # BLD AUTO: 0.46 10*3/MM3 (ref 0.1–0.9)
MONOCYTES NFR BLD AUTO: 6.1 % (ref 5–12)
NEUTROPHILS NFR BLD AUTO: 4.38 10*3/MM3 (ref 1.7–7)
NEUTROPHILS NFR BLD AUTO: 57.7 % (ref 42.7–76)
NRBC BLD AUTO-RTO: 0 /100 WBC (ref 0–0.2)
PLATELET # BLD AUTO: 359 10*3/MM3 (ref 140–450)
PMV BLD AUTO: 9.8 FL (ref 6–12)
POTASSIUM SERPL-SCNC: 4.4 MMOL/L (ref 3.5–5.2)
PROT SERPL-MCNC: 6.3 G/DL (ref 6–8.5)
RBC # BLD AUTO: 3.98 10*6/MM3 (ref 4.14–5.8)
SODIUM SERPL-SCNC: 137 MMOL/L (ref 136–145)
TSH SERPL DL<=0.05 MIU/L-ACNC: 4.24 UIU/ML (ref 0.27–4.2)
WBC NRBC COR # BLD: 7.59 10*3/MM3 (ref 3.4–10.8)

## 2022-07-21 PROCEDURE — 82150 ASSAY OF AMYLASE: CPT

## 2022-07-21 PROCEDURE — 84436 ASSAY OF TOTAL THYROXINE: CPT

## 2022-07-21 PROCEDURE — 86003 ALLG SPEC IGE CRUDE XTRC EA: CPT

## 2022-07-21 PROCEDURE — 36415 COLL VENOUS BLD VENIPUNCTURE: CPT

## 2022-07-21 PROCEDURE — 86364 TISS TRNSGLTMNASE EA IG CLAS: CPT

## 2022-07-21 PROCEDURE — 83690 ASSAY OF LIPASE: CPT

## 2022-07-21 PROCEDURE — 82785 ASSAY OF IGE: CPT

## 2022-07-21 PROCEDURE — 80050 GENERAL HEALTH PANEL: CPT

## 2022-07-21 PROCEDURE — 82784 ASSAY IGA/IGD/IGG/IGM EACH: CPT

## 2022-07-21 PROCEDURE — 83036 HEMOGLOBIN GLYCOSYLATED A1C: CPT

## 2022-07-21 PROCEDURE — 84479 ASSAY OF THYROID (T3 OR T4): CPT

## 2022-07-21 PROCEDURE — 86231 EMA EACH IG CLASS: CPT

## 2022-07-21 PROCEDURE — 86008 ALLG SPEC IGE RECOMB EA: CPT

## 2022-07-21 PROCEDURE — 85652 RBC SED RATE AUTOMATED: CPT

## 2022-07-22 LAB
FT4I SERPL CALC-MCNC: 1.6 (ref 1.2–4.9)
T3RU NFR SERPL: 25 % (ref 24–39)
T4 SERPL-MCNC: 6.2 UG/DL (ref 4.5–12)

## 2022-07-23 LAB
ENDOMYSIUM IGA SER QL: NEGATIVE
IGA SERPL-MCNC: 209 MG/DL (ref 90–386)
TTG IGA SER-ACNC: <2 U/ML (ref 0–3)

## 2022-07-25 LAB
ALPHA-GAL IGE QN: <0.1 KU/L
BEEF IGE QN: 0.12 KU/L
CONV CLASS DESCRIPTION: ABNORMAL
IGE SERPL-ACNC: 12 IU/ML (ref 6–495)
LAMB IGE QN: <0.1 KU/L
PORK IGE QN: <0.1 KU/L

## 2022-08-02 LAB — REF LAB TEST RESULTS: NORMAL

## 2023-02-24 ENCOUNTER — HOSPITAL ENCOUNTER (EMERGENCY)
Facility: HOSPITAL | Age: 43
Discharge: HOME | End: 2023-02-24
Attending: EMERGENCY MEDICINE | Admitting: EMERGENCY MEDICINE
Payer: COMMERCIAL

## 2023-02-24 VITALS
OXYGEN SATURATION: 99 % | DIASTOLIC BLOOD PRESSURE: 86 MMHG | HEART RATE: 70 BPM | HEIGHT: 68 IN | TEMPERATURE: 98 F | SYSTOLIC BLOOD PRESSURE: 150 MMHG | WEIGHT: 158 LBS | RESPIRATION RATE: 15 BRPM | BODY MASS INDEX: 23.95 KG/M2

## 2023-02-24 DIAGNOSIS — K05.219 GUM ABSCESS: Primary | ICD-10-CM

## 2023-02-24 PROCEDURE — 99283 EMERGENCY DEPT VISIT LOW MDM: CPT

## 2023-02-24 RX ORDER — LIDOCAINE HYDROCHLORIDE 20 MG/ML
10 SOLUTION OROPHARYNGEAL ONCE
Status: COMPLETED | OUTPATIENT
Start: 2023-02-24 | End: 2023-02-24

## 2023-02-24 RX ORDER — LIDOCAINE HYDROCHLORIDE 20 MG/ML
10 INJECTION, SOLUTION INFILTRATION; PERINEURAL ONCE
Status: COMPLETED | OUTPATIENT
Start: 2023-02-24 | End: 2023-02-24

## 2023-02-24 RX ADMIN — TOPICAL ANESTHETIC 1 SPRAY: 200 SPRAY DENTAL; PERIODONTAL at 14:34

## 2023-02-24 RX ADMIN — LIDOCAINE HYDROCHLORIDE 10 ML: 20 SOLUTION ORAL at 14:34

## 2023-02-24 RX ADMIN — LIDOCAINE HYDROCHLORIDE 10 ML: 20 INJECTION, SOLUTION INFILTRATION; PERINEURAL at 14:34

## 2023-04-20 ENCOUNTER — HOSPITAL ENCOUNTER (EMERGENCY)
Facility: HOSPITAL | Age: 43
Discharge: HOME | End: 2023-04-20
Attending: EMERGENCY MEDICINE
Payer: COMMERCIAL

## 2023-04-20 VITALS
BODY MASS INDEX: 20.32 KG/M2 | DIASTOLIC BLOOD PRESSURE: 84 MMHG | SYSTOLIC BLOOD PRESSURE: 135 MMHG | HEIGHT: 72 IN | OXYGEN SATURATION: 100 % | WEIGHT: 150 LBS | HEART RATE: 84 BPM | RESPIRATION RATE: 16 BRPM | TEMPERATURE: 98.5 F

## 2023-04-20 DIAGNOSIS — K08.89 PAIN, DENTAL: Primary | ICD-10-CM

## 2023-04-20 DIAGNOSIS — R11.0 NAUSEA: ICD-10-CM

## 2023-04-20 PROCEDURE — 63710000001 ONDANSETRON ODT 4 MG TABLET DISPERSIBLE: Performed by: PHYSICIAN ASSISTANT

## 2023-04-20 PROCEDURE — 99283 EMERGENCY DEPT VISIT LOW MDM: CPT

## 2023-04-20 RX ORDER — AMOXICILLIN AND CLAVULANATE POTASSIUM 250; 125 MG/1; MG/1
1 TABLET, FILM COATED ORAL ONCE
Status: COMPLETED | OUTPATIENT
Start: 2023-04-20 | End: 2023-04-20

## 2023-04-20 RX ORDER — ASPIRIN 81 MG/1
81 TABLET ORAL DAILY
COMMUNITY

## 2023-04-20 RX ORDER — AMOXICILLIN AND CLAVULANATE POTASSIUM 875; 125 MG/1; MG/1
1 TABLET, FILM COATED ORAL 2 TIMES DAILY
Qty: 14 TABLET | Refills: 0 | Status: SHIPPED | OUTPATIENT
Start: 2023-04-20 | End: 2023-04-27

## 2023-04-20 RX ORDER — LIDOCAINE HYDROCHLORIDE 20 MG/ML
10 SOLUTION OROPHARYNGEAL ONCE
Status: COMPLETED | OUTPATIENT
Start: 2023-04-20 | End: 2023-04-20

## 2023-04-20 RX ORDER — ACETAMINOPHEN 325 MG/1
650 TABLET ORAL EVERY 6 HOURS PRN
Status: DISCONTINUED | OUTPATIENT
Start: 2023-04-20 | End: 2023-04-21 | Stop reason: HOSPADM

## 2023-04-20 RX ORDER — ONDANSETRON 4 MG/1
4 TABLET, ORALLY DISINTEGRATING ORAL EVERY 6 HOURS PRN
Qty: 8 TABLET | Refills: 0 | Status: SHIPPED | OUTPATIENT
Start: 2023-04-20 | End: 2023-04-22

## 2023-04-20 RX ORDER — ONDANSETRON 4 MG/1
4 TABLET, ORALLY DISINTEGRATING ORAL ONCE
Status: COMPLETED | OUTPATIENT
Start: 2023-04-20 | End: 2023-04-20

## 2023-04-20 RX ADMIN — AMOXICILLIN AND CLAVULANATE POTASSIUM 1 TABLET: 250; 125 TABLET, FILM COATED ORAL at 22:06

## 2023-04-20 RX ADMIN — ONDANSETRON 4 MG: 4 TABLET, ORALLY DISINTEGRATING ORAL at 22:05

## 2023-04-20 RX ADMIN — TOPICAL ANESTHETIC 1 SPRAY: 200 SPRAY DENTAL; PERIODONTAL at 22:05

## 2023-04-20 RX ADMIN — ACETAMINOPHEN 650 MG: 325 TABLET, FILM COATED ORAL at 22:06

## 2023-04-20 RX ADMIN — LIDOCAINE HYDROCHLORIDE 10 ML: 20 SOLUTION ORAL; TOPICAL at 22:05

## 2023-04-21 NOTE — DISCHARGE INSTRUCTIONS
There could be an underlying infection to the tooth.  Take antibiotics as directed until course is complete.  Use topical dental medication to help with pain. Use warm compresses as needed.  Try to follow-up with your dentist as early as possible, may need further intervention.  Return to ER for any change or worsening symptoms, or any additional concerns.

## 2023-04-21 NOTE — ED PROVIDER NOTES
"Subjective  History of Present Illness:    Chief Complaint: Dental pain  History of Present Illness: Patient is a 42-year-old male with history of diabetes and hypertension presenting to the ER for evaluation of tooth pain.  Patient reports that he has had a history of dental abscesses and dental infection, states he has very poor dentition in general.  He states that he began having pain around the tooth in his left lower jaw, thinks there may be some infection.  He states he has had some nausea today, has not noticed any facial swelling, fever, vomiting.  He has not been on any antibiotics recently.  He has reported allergies to naproxen and codeine.  Onset: Today  Duration: Persistent  Exacerbating / Alleviating factors: None  Associated symptoms: None      Nurses Notes reviewed and agree, including vitals, allergies, social history and prior medical history.     REVIEW OF SYSTEMS: All systems reviewed and not pertinent unless noted.  Review of Systems      Positive for: Dental pain, nausea    Negative for: Throat pain, difficulty swallowing, fever, facial swelling    Past Medical History:   Diagnosis Date   • Diabetes mellitus    • Hypertension        Allergies:    Naproxen and Codeine      Past Surgical History:   Procedure Laterality Date   • ORTHOPEDIC SURGERY           Social History     Socioeconomic History   • Marital status:    Tobacco Use   • Smoking status: Never   Substance and Sexual Activity   • Alcohol use: Yes     Comment: occassional   • Drug use: No         History reviewed. No pertinent family history.    Objective  Physical Exam:  /84 (BP Location: Left arm, Patient Position: Sitting)   Pulse 84   Temp 98.5 °F (36.9 °C) (Oral)   Resp 16   Ht 182.9 cm (72\")   Wt 68 kg (150 lb)   SpO2 100%   BMI 20.34 kg/m²      Physical Exam    CONSTITUTIONAL: Well developed, no acute distress, nontoxic in appearance.  VITAL SIGNS: per nursing, reviewed and noted  SKIN: exposed skin with no " rashes, ulcerations or petechiae  EYES: Grossly EOMI, no icterus, PERRL  ENT: Normal voice.  Patient has poor dentition with missing teeth.  He has a tooth in the left lower jaw with receding gumline, there is some erythema to the base, no fluctuant area noted.  Tongue midline, posterior pharynx unremarkable  RESPIRATORY:  No increased work of breathing. No retractions.  Lung sounds clear to auscultation bilaterally  CARDIOVASCULAR:  regular rate and rhythm  GI: Abdomen soft, nontender to palpation   MUSCULOSKELETAL:  No tenderness. Full ROM. Strength and tone grossly normal.  NEUROLOGIC: Alert, oriented x 3. No gross deficits. GCS 15.   PSYCH: appropriate affect.      Procedures    ED Course:             Lab Results (last 24 hours)     ** No results found for the last 24 hours. **           No radiology results from the last 24 hrs       MDM    Initial impression of presenting illness: Patient is a 42-year-old male presenting to the ER for evaluation of dental pain.  On arrival he is stable, no acute distress, nontoxic in appearance.    DDX: includes but is not limited to: Dental caries, abscess, dental infection    Patient arrives in stable condition with vitals interpreted by myself.     Pertinent features from physical exam:  poor dentition, dental caries to the teeth in the left lower jaw, erythema to the gumline, no obvious fluctuant area noted    Initial diagnostic plan: We will make topical dental balls, give antibiotics.  Will give a dose of Tylenol.  Patient has reported allergies to naproxen and codeine.    Diagnostic information from other sources: Chart review    Interventions / Re-evaluation: Patient remained stable throughout visit    Results/clinical rationale were discussed with patient.  Discussed that he is going to need to follow-up with dentistry.     Consultations/Discussion of results with other physicians: None    Disposition plan: Discharge  -----    Final diagnoses:   Pain, dental   Nausea         Rebecca Sheriff PA-C  04/20/23 4738

## 2023-05-27 ENCOUNTER — HOSPITAL ENCOUNTER (EMERGENCY)
Facility: HOSPITAL | Age: 43
Discharge: HOME | End: 2023-05-27
Attending: EMERGENCY MEDICINE
Payer: COMMERCIAL

## 2023-05-27 VITALS
RESPIRATION RATE: 18 BRPM | HEIGHT: 72 IN | OXYGEN SATURATION: 99 % | DIASTOLIC BLOOD PRESSURE: 65 MMHG | WEIGHT: 151 LBS | SYSTOLIC BLOOD PRESSURE: 130 MMHG | TEMPERATURE: 98.3 F | BODY MASS INDEX: 20.45 KG/M2 | HEART RATE: 69 BPM

## 2023-05-27 DIAGNOSIS — K04.7 DENTAL INFECTION: Primary | ICD-10-CM

## 2023-05-27 PROCEDURE — 99283 EMERGENCY DEPT VISIT LOW MDM: CPT

## 2023-05-27 RX ORDER — LIDOCAINE HYDROCHLORIDE 20 MG/ML
JELLY TOPICAL AS NEEDED
Status: DISCONTINUED | OUTPATIENT
Start: 2023-05-27 | End: 2023-05-27 | Stop reason: HOSPADM

## 2023-05-27 RX ORDER — CLINDAMYCIN HYDROCHLORIDE 150 MG/1
450 CAPSULE ORAL 3 TIMES DAILY
Qty: 63 CAPSULE | Refills: 0 | Status: SHIPPED | OUTPATIENT
Start: 2023-05-27 | End: 2023-06-03

## 2023-05-27 RX ORDER — LIDOCAINE HYDROCHLORIDE 20 MG/ML
10 SOLUTION OROPHARYNGEAL ONCE
Status: DISCONTINUED | OUTPATIENT
Start: 2023-05-27 | End: 2023-05-27 | Stop reason: HOSPADM

## 2023-05-27 RX ORDER — CLINDAMYCIN HYDROCHLORIDE 150 MG/1
450 CAPSULE ORAL ONCE
Status: COMPLETED | OUTPATIENT
Start: 2023-05-27 | End: 2023-05-27

## 2023-05-27 RX ADMIN — LIDOCAINE HYDROCHLORIDE: 20 JELLY TOPICAL at 04:09

## 2023-05-27 RX ADMIN — CLINDAMYCIN HYDROCHLORIDE 450 MG: 150 CAPSULE ORAL at 04:09

## 2023-05-27 RX ADMIN — TOPICAL ANESTHETIC 1 SPRAY: 200 SPRAY DENTAL; PERIODONTAL at 04:09

## 2023-05-27 NOTE — ED PROVIDER NOTES
HPI: Obinna Riddle is a 42 y.o. male who presents to the emergency department complaining of tooth ache, facial swelling.  He states that today he noticed his left lower molar was painful, left jaw started swelling.  Has had no fevers or other complaints.  Notes issues with the same tooth in the past.  Was supposed to have the tooth removed but did not follow-up due to illness.      REVIEW OF SYSTEMS: All other systems reviewed and are negative     PAST MEDICAL HISTORY:   Past Medical History:   Diagnosis Date   • Diabetes mellitus    • Hypertension         FAMILY HISTORY:   History reviewed. No pertinent family history.     SOCIAL HISTORY:   Social History     Socioeconomic History   • Marital status:    Tobacco Use   • Smoking status: Never   Substance and Sexual Activity   • Alcohol use: Yes     Comment: occassional   • Drug use: No        SURGICAL HISTORY:   Past Surgical History:   Procedure Laterality Date   • ORTHOPEDIC SURGERY          ALLERGIES: Naproxen and Codeine       PHYSICAL EXAM:   VITAL SIGNS:   Vitals:    05/27/23 0315   BP: 133/69   Pulse: 68   Resp:    Temp:    SpO2: 98%      CONSTITUTIONAL: Awake, well appearing, nontoxic   HENT: Atraumatic, swelling left jaw, oral mucosa moist, airway patent.  Poor dentition throughout, tenderness to percussion left molar, no fluctuance, buccal space is indurated nares patent without drainage. External ears normal.   EYES: Conjunctivae clear, EOMI, PERRL   NECK: Trachea midline, nontender, supple   CARDIOVASCULAR: Normal heart rate, Normal rhythm.  PULMONARY/CHEST: Normal work of breathing. Clear to auscultation, no rhonchi, wheezes, or rales.  ABDOMINAL: Nondistended, soft, nontender, no rebound or guarding.  NEUROLOGIC: Nonfocal, moves all four extremities, no gross sensory or motor deficits.   EXTREMITIES: No clubbing, cyanosis, or edema   SKIN: Warm, Dry, No erythema, No rash       ED COURSE / MEDICAL DECISION MAKING:     Obinna Nettles  Venkatesh is a 42 y.o. male who presents to the emergency department for evaluation of tooth ache.  Well-developed and well-nourished man in no distress with exam as above.  His vital signs are normal.  His oxygen saturation is normal on room air at 98%.  He has no signs or symptoms of systemic illness.  Exam is consistent with dental infection, no area that feels fluctuant indicating need for I&D.  Will treat with course of antibiotics and dentistry follow-up.  He is comfortable with and understanding of the plan.    Differential diagnosis includes dental infection, dental caries, periapical abscess among other etiologies.        Final diagnoses:   Dental infection        Sergey Toledo MD  05/27/23 3180

## 2025-03-04 ENCOUNTER — HOSPITAL ENCOUNTER (EMERGENCY)
Facility: HOSPITAL | Age: 45
Discharge: HOME | End: 2025-03-04
Attending: STUDENT IN AN ORGANIZED HEALTH CARE EDUCATION/TRAINING PROGRAM | Admitting: STUDENT IN AN ORGANIZED HEALTH CARE EDUCATION/TRAINING PROGRAM
Payer: COMMERCIAL

## 2025-03-04 ENCOUNTER — APPOINTMENT (OUTPATIENT)
Dept: GENERAL RADIOLOGY | Facility: HOSPITAL | Age: 45
End: 2025-03-04
Payer: COMMERCIAL

## 2025-03-04 VITALS
OXYGEN SATURATION: 97 % | HEIGHT: 72 IN | BODY MASS INDEX: 22.35 KG/M2 | TEMPERATURE: 97.9 F | RESPIRATION RATE: 12 BRPM | DIASTOLIC BLOOD PRESSURE: 84 MMHG | SYSTOLIC BLOOD PRESSURE: 156 MMHG | HEART RATE: 84 BPM | WEIGHT: 165 LBS

## 2025-03-04 DIAGNOSIS — M25.572 ACUTE LEFT ANKLE PAIN: Primary | ICD-10-CM

## 2025-03-04 DIAGNOSIS — M79.672 LEFT FOOT PAIN: ICD-10-CM

## 2025-03-04 PROCEDURE — 99283 EMERGENCY DEPT VISIT LOW MDM: CPT | Performed by: STUDENT IN AN ORGANIZED HEALTH CARE EDUCATION/TRAINING PROGRAM

## 2025-03-04 PROCEDURE — 63710000001 ONDANSETRON ODT 4 MG TABLET DISPERSIBLE: Performed by: STUDENT IN AN ORGANIZED HEALTH CARE EDUCATION/TRAINING PROGRAM

## 2025-03-04 PROCEDURE — 73630 X-RAY EXAM OF FOOT: CPT

## 2025-03-04 PROCEDURE — 73610 X-RAY EXAM OF ANKLE: CPT

## 2025-03-04 PROCEDURE — 99283 EMERGENCY DEPT VISIT LOW MDM: CPT

## 2025-03-04 RX ORDER — HYDROCODONE BITARTRATE AND ACETAMINOPHEN 7.5; 325 MG/1; MG/1
1 TABLET ORAL ONCE
Status: COMPLETED | OUTPATIENT
Start: 2025-03-04 | End: 2025-03-04

## 2025-03-04 RX ORDER — METHOCARBAMOL 500 MG/1
500 TABLET, FILM COATED ORAL 4 TIMES DAILY
Qty: 12 TABLET | Refills: 0 | Status: SHIPPED | OUTPATIENT
Start: 2025-03-04 | End: 2025-03-07

## 2025-03-04 RX ORDER — ONDANSETRON 4 MG/1
4 TABLET, ORALLY DISINTEGRATING ORAL ONCE
Status: COMPLETED | OUTPATIENT
Start: 2025-03-04 | End: 2025-03-04

## 2025-03-04 RX ADMIN — HYDROCODONE BITARTRATE AND ACETAMINOPHEN 1 TABLET: 7.5; 325 TABLET ORAL at 18:07

## 2025-03-04 RX ADMIN — ONDANSETRON 4 MG: 4 TABLET, ORALLY DISINTEGRATING ORAL at 18:08

## 2025-03-04 NOTE — DISCHARGE INSTRUCTIONS
Use your walking boot and crutches to ambulate when you are resting elevate apply ice and rest your foot as tolerated and follow-up with an orthopedic surgeon as soon as possible as we are suspicious she may have a ligament injury such as an Achilles tendon tear that will require further evaluation with orthopedic surgery.

## 2025-03-04 NOTE — ED PROVIDER NOTES
EMERGENCY DEPARTMENT ENCOUNTER    Pt Name: Obinna Riddle  MRN: [id number]  Pt :   1980  Room Number:  02SF/02  Date of encounter:  3/4/2025  PCP: Rosalino Arellano MD  ED Provider: Patricio Maynard PA-C    Historian: Patient, nursing notes      HPI:  Chief Complaint: Left foot pain        Context: Obinna Riddle is a 44 y.o. male who presents to the ED c/o left foot and left ankle pain that happened suddenly while playing basketball with his children.  Patient states he heard a loud popping sensation and felt instant pain in his [place] to bear weight on his heel since then.  He denies knee pain, hip pain, numbness, or any other complaint today.      PAST MEDICAL HISTORY  Past Medical History:   Diagnosis Date    Diabetes mellitus     Hypertension          PAST SURGICAL HISTORY  Past Surgical History:   Procedure Laterality Date    ORTHOPEDIC SURGERY           FAMILY HISTORY  History reviewed. No pertinent family history.      SOCIAL HISTORY  Social History     Socioeconomic History    Marital status:    Tobacco Use    Smoking status: Never   Substance and Sexual Activity    Alcohol use: Yes     Comment: occassional    Drug use: No         ALLERGIES  Naproxen and Codeine        REVIEW OF SYSTEMS  Review of Systems   Constitutional:  Negative for chills and fever.   HENT:  Negative for congestion and sore throat.    Respiratory:  Negative for cough and shortness of breath.    Cardiovascular:  Negative for chest pain.   Gastrointestinal:  Negative for abdominal pain, nausea and vomiting.   Genitourinary:  Negative for dysuria.   Musculoskeletal:  Negative for back pain.        Left ankle pain, left foot pain   Skin:  Negative for wound.   Neurological:  Negative for dizziness and headaches.   Psychiatric/Behavioral:  Negative for confusion.    All other systems reviewed and are negative.         All systems reviewed and negative except for those discussed in HPI.       PHYSICAL  EXAM    I have reviewed the triage vital signs and nursing notes.    ED Triage Vitals [03/04/25 1749]   Temp Heart Rate Resp BP SpO2   97.9 °F (36.6 °C) 84 12 156/84 97 %      Temp src Heart Rate Source Patient Position BP Location FiO2 (%)   Oral Monitor Sitting Left arm --       Physical Exam  Vitals and nursing note reviewed.   Constitutional:       General: He is not in acute distress.     Appearance: Normal appearance. He is not ill-appearing, toxic-appearing or diaphoretic.   HENT:      Head: Normocephalic and atraumatic.      Right Ear: External ear normal.      Left Ear: External ear normal.      Nose: No congestion or rhinorrhea.      Mouth/Throat:      Mouth: Mucous membranes are moist.      Pharynx: Oropharynx is clear.   Eyes:      Conjunctiva/sclera: Conjunctivae normal.   Cardiovascular:      Rate and Rhythm: Normal rate.      Heart sounds: Normal heart sounds.   Pulmonary:      Effort: Pulmonary effort is normal. No respiratory distress.      Breath sounds: Normal breath sounds. No wheezing.   Abdominal:      Tenderness: There is no abdominal tenderness.   Musculoskeletal:      Cervical back: Normal range of motion and neck supple.      Right lower leg: No edema.      Left lower leg: No edema.      Left ankle: No swelling. Tenderness present.      Left Achilles Tendon: Tenderness present.      Left foot: Tenderness and bony tenderness present. No swelling.   Skin:     Findings: No rash.   Neurological:      Mental Status: He is alert.             LAB RESULTS  No results found for this or any previous visit (from the past 24 hours).    If labs were ordered, I independently reviewed the results and considered them in treating the patient.        RADIOLOGY  No Radiology Exams Resulted Within Past 24 Hours    I ordered and independently reviewed the above noted radiographic studies.      I viewed images of left ankle x-ray which showed no acute fracture per my independent interpretation.    I viewed  images of left foot x-ray which showed no acute fracture per my independent interpretation    See radiologist's dictation for official interpretation.        PROCEDURES    Procedures    No orders to display       MEDICATIONS GIVEN IN ER    Medications   HYDROcodone-acetaminophen (NORCO) 7.5-325 MG per tablet 1 tablet (1 tablet Oral Given 3/4/25 1807)   ondansetron ODT (ZOFRAN-ODT) disintegrating tablet 4 mg (4 mg Oral Given 3/4/25 1808)         MEDICAL DECISION MAKING, PROGRESS, and CONSULTS    All labs, if obtained, have been independently reviewed by me.  All radiology studies, if obtained, have been reviewed by me and the radiologist dictating the report.  All EKG's, if obtained, have been independently viewed and interpreted by me/my attending physician.      Discussion below represents my analysis of pertinent findings related to patient's condition, differential diagnosis, treatment plan and final disposition.    Patient is a 44-year-old male presenting to ER for evaluation of left foot and ankle pain after an injury sustained while playing basketball.  On arrival patient's left lower extremity is neurovascularly intact he has warm well-perfused foot easily palpable DP and PT pulses normal capillary refill.  Tenderness along the base of the Achilles tendon, anterior ankle and dorsal foot.  Plan will be for x-ray evaluation.  Patient given Norco and Zofran.    X-rays negative in the ankle or foot for acute fracture per radiologist.  Given concern for ligamentous injury or possible partial Achilles tendon tear, patient given walking boot and crutches and urgent orthopedic surgery referral for follow-up.  Pain medications prescribed.  Strict ED return precautions were explained and the patient verbalized understanding of and agreement with this plan of care.                       Differential diagnosis:    Differential diagnosis included but was not limited to fall, fracture, contusion, ligamentous injury, tendon  rupture      Additional sources:    - Discussed/ obtained information from independent historians: None    - External (non-ED) record review: Previous medical records reviewed    - Chronic or social conditions impacting care: None    Orders placed during this visit:  Orders Placed This Encounter   Procedures    XR Ankle 3+ View Left    XR Foot 3+ View Left    Ambulatory Referral to Orthopedic Surgery    Obtain & Apply The Following- Lower extremity; Walking boot    Crutches (fit & training)         Additional orders considered but not ordered: None      ED Course:    Consultants: None                Shared Decision Making:  After my consideration of clinical presentation and any laboratory/radiology studies obtained, I discussed the findings with the patient/patient representative who is in agreement with the treatment plan and the final disposition.   Risks and benefits of discharge and/or observation/admission were discussed.       AS OF 23:58 EST VITALS:    BP - 156/84  HR - 84  TEMP - 97.9 °F (36.6 °C) (Oral)  O2 SATS - 97%                  DIAGNOSIS  Final diagnoses:   Acute left ankle pain   Left foot pain         DISPOSITION  Discharge      Please note that portions of this document were completed with voice recognition software.      Patricio Maynard PA-C  03/05/25 [id number]